# Patient Record
Sex: FEMALE | Race: WHITE
[De-identification: names, ages, dates, MRNs, and addresses within clinical notes are randomized per-mention and may not be internally consistent; named-entity substitution may affect disease eponyms.]

---

## 2017-10-21 ENCOUNTER — HOSPITAL ENCOUNTER (EMERGENCY)
Dept: HOSPITAL 53 - M ED | Age: 37
Discharge: HOME | End: 2017-10-21
Payer: SELF-PAY

## 2017-10-21 VITALS — SYSTOLIC BLOOD PRESSURE: 169 MMHG | DIASTOLIC BLOOD PRESSURE: 108 MMHG

## 2017-10-21 VITALS — BODY MASS INDEX: 30.3 KG/M2 | HEIGHT: 59 IN | WEIGHT: 150.31 LBS

## 2017-10-21 DIAGNOSIS — I10: ICD-10-CM

## 2017-10-21 DIAGNOSIS — R19.7: ICD-10-CM

## 2017-10-21 DIAGNOSIS — Z88.5: ICD-10-CM

## 2017-10-21 DIAGNOSIS — R10.9: ICD-10-CM

## 2017-10-21 DIAGNOSIS — N70.91: Primary | ICD-10-CM

## 2017-10-21 DIAGNOSIS — F17.210: ICD-10-CM

## 2017-10-21 LAB
ALBUMIN SERPL BCG-MCNC: 4.1 GM/DL (ref 3.2–5.2)
ALBUMIN/GLOB SERPL: 1.05 {RATIO} (ref 1–1.93)
ALP SERPL-CCNC: 106 U/L (ref 45–117)
ALT SERPL W P-5'-P-CCNC: 21 U/L (ref 12–78)
AMYLASE SERPL-CCNC: 53 U/L (ref 25–115)
ANION GAP SERPL CALC-SCNC: 8 MEQ/L (ref 8–16)
AST SERPL-CCNC: 10 U/L (ref 15–37)
BASOPHILS # BLD AUTO: 0.1 10^3/UL (ref 0–0.2)
BASOPHILS NFR BLD AUTO: 0.4 % (ref 0–1)
BILIRUB CONJ SERPL-MCNC: 0.2 MG/DL (ref 0–0.2)
BILIRUB SERPL-MCNC: 0.7 MG/DL (ref 0.2–1)
BUN SERPL-MCNC: 10 MG/DL (ref 7–18)
CALCIUM SERPL-MCNC: 9.1 MG/DL (ref 8.5–10.1)
CHLORIDE SERPL-SCNC: 104 MEQ/L (ref 98–107)
CO2 SERPL-SCNC: 24 MEQ/L (ref 21–32)
CONTROL LINE UCG: (no result)
CREAT SERPL-MCNC: 0.64 MG/DL (ref 0.55–1.02)
EOSINOPHIL # BLD AUTO: 0.2 10^3/UL (ref 0–0.5)
EOSINOPHIL NFR BLD AUTO: 1.5 % (ref 0–3)
ERYTHROCYTE [DISTWIDTH] IN BLOOD BY AUTOMATED COUNT: 12.7 % (ref 11.5–14.5)
GFR SERPL CREATININE-BSD FRML MDRD: > 60 ML/MIN/{1.73_M2} (ref 60–?)
GLUCOSE SERPL-MCNC: 84 MG/DL (ref 70–105)
IMM GRANULOCYTES NFR BLD: 0.2 % (ref 0–0)
LYMPHOCYTES # BLD AUTO: 2.5 10^3/UL (ref 1.5–4.5)
LYMPHOCYTES NFR BLD AUTO: 21.1 % (ref 24–44)
MCH RBC QN AUTO: 30.9 PG (ref 27–33)
MCHC RBC AUTO-ENTMCNC: 33.3 G/DL (ref 32–36.5)
MCV RBC AUTO: 93.1 FL (ref 80–96)
MONOCYTES # BLD AUTO: 0.6 10^3/UL (ref 0–0.8)
MONOCYTES NFR BLD AUTO: 5.1 % (ref 0–5)
NEUTROPHILS # BLD AUTO: 8.3 10^3/UL (ref 1.8–7.7)
NEUTROPHILS NFR BLD AUTO: 71.7 % (ref 36–66)
NRBC BLD AUTO-RTO: 0 % (ref 0–0)
PLATELET # BLD AUTO: 277 10^3/UL (ref 150–450)
POTASSIUM SERPL-SCNC: 3.4 MEQ/L (ref 3.5–5.1)
PROT SERPL-MCNC: 8 GM/DL (ref 6.4–8.2)
SODIUM SERPL-SCNC: 136 MEQ/L (ref 136–145)
WBC # BLD AUTO: 11.6 10^3/UL (ref 4–10)

## 2017-10-21 PROCEDURE — 76857 US EXAM PELVIC LIMITED: CPT

## 2017-10-21 PROCEDURE — 81001 URINALYSIS AUTO W/SCOPE: CPT

## 2017-10-21 PROCEDURE — 99284 EMERGENCY DEPT VISIT MOD MDM: CPT

## 2017-10-21 PROCEDURE — 83690 ASSAY OF LIPASE: CPT

## 2017-10-21 PROCEDURE — 82550 ASSAY OF CK (CPK): CPT

## 2017-10-21 PROCEDURE — 84703 CHORIONIC GONADOTROPIN ASSAY: CPT

## 2017-10-21 PROCEDURE — 82553 CREATINE MB FRACTION: CPT

## 2017-10-21 PROCEDURE — 80053 COMPREHEN METABOLIC PANEL: CPT

## 2017-10-21 PROCEDURE — 85025 COMPLETE CBC W/AUTO DIFF WBC: CPT

## 2017-10-21 PROCEDURE — 82150 ASSAY OF AMYLASE: CPT

## 2017-10-21 PROCEDURE — 71020: CPT

## 2017-10-21 PROCEDURE — 93005 ELECTROCARDIOGRAM TRACING: CPT

## 2017-10-21 PROCEDURE — 96374 THER/PROPH/DIAG INJ IV PUSH: CPT

## 2017-10-21 PROCEDURE — 74177 CT ABD & PELVIS W/CONTRAST: CPT

## 2017-10-21 PROCEDURE — 96376 TX/PRO/DX INJ SAME DRUG ADON: CPT

## 2017-10-21 NOTE — REPUSA
HISTORY: LT HYDROSALPINX WITH 2 RIM ENHANCHING CYSTIC LESIONS SEEN ON CT.  comparison is made to the
 CT of abdomen and pelvis today on 10/21/17 that demonstrated left hydrosalpinx.

 

TECHNIQUE: Transabdominal and transvaginal pelvic ultrasound examination with color flow Doppler imag
ing.

 

FINDINGS: Uterus is anteverted in measures 9.2 x 4.0 x 5.8 cm and endometrial thickness measures 1.3 
cm.  No uterine is seen.

 

Right ovary measures 2.7 x 3.1 x 1.9 cm with normal Doppler vascularity and contains an exophytic cys
t measuring 1.6 x 1.3 x 1.2 cm.  Left ovary measures 3.0 x 1.9 x 3.6 cm with no pathologic mass or ab
normal fluid collection seen, but there is limited visualization of the left adnexa.  Because of lubna
l gas.  The left hydrosalpinx and cystic lesions identified on CT scan are not demonstrated on ultras
ound examination.

 

IMPRESSION: 1.  Benign cyst seen in the right ovary.  Uterus is normal.

2.  Normal appearance of left ovary on limited ultrasound examination with inability to visualize the
 left hydrosalpinx and cystic lesions seen on today's CT scan.

 

Clinical correlation and follow-up imaging is suggested as indicated.  Consider follow-up CT or MRI i
f clinical symptomatology warrants further investigation.

 

     Electronically signed by LISA WHITEHEAD MD on 10/21/2017 09:30:02 PM ET

## 2017-10-21 NOTE — REPUSA
HISTORY: LUQ PAIN.

 

TECHNIQUE: Axial CT imaging of abdomen and pelvis with intravenous contrast enhancement and sagittal 
and coronal reformatted imaging. DLP= 637.9 mGy-cm.

 

FINDINGS: Lung bases are clear. No bony fracture or destructive bony lesion is seen in the lower thor
ax, abdomen, and pelvis.

 

Liver, biliary tree, pancreas, and spleen are normal.

Adrenal glands are normal. Right and left kidneys are normal with no evidence of mass lesion, obstruc
tion, or nephrolithiasis seen. Ureters and urinary bladder are normal. There is no evidence of abdomi
nal aortic aneurysm. No retroperitoneal mass lesion or hemorrhage is seen.

There is fluid-filled dilatation of the left fallopian tube measuring 1.9 cm in maximal thickness, be
st seen on the coronal sequence images 33-39, containing a rim contrast-enhanced rounded cystic struc
ture measuring 1.6 cm, and a second smaller 6 mm rim-enhancing cystic lesion at the junction of the l
eft fallopian tube and uterus seen on the axial sequence image 124.  Differential diagnostic possibil
ities include hydrosalpinx and PID, endometriosis, or possible ectopic pregnancy.  Clinical correlati
on and follow-up imaging is suggested as indicated.

 

No other pelvic mass lesions are seen.  There is minimal diverticulosis in the sigmoid colon with no 
evidence of bowel wall mass lesion, obstruction, perforation, inflammatory reaction, or evidence of d
iverticulitis.  The appendix is visualized and normal.  No abdominal wall hernia is seen.

 

 

IMPRESSION: 1.  Abnormal fluid filled distention and dilatation of the left fallopian tube consistent
 with hydrosalpinx with 2 contrast, rim enhanced cystic lesions within the dilated fallopian tube as 
discussed above.  Clinical correlation and follow-up is suggested to evaluate for possible PID, endom
etriosis, or ectopic pregnancy.

2.  Minimal diverticulosis in the sigmoid colon with no evidence of diverticulitis or other bowel abn
ormality.

3.  The remainder of the CT examination of the abdomen and pelvis is normal.

     Electronically signed by LISA WHITEHEAD MD on 10/21/2017 06:23:19 PM ET

## 2017-10-22 NOTE — ED PDOC
Post-Departure Follow-Up


radiology report faxed to [facility] clinic











Airam Ritter MD Oct 22, 2017 07:54

## 2017-10-22 NOTE — ECGEPIP
Stationary ECG Study

                           [facility] - ED

                                       

                                       Test Date:    2017-10-21

Pat Name:     PEPE LI           Department:   

Patient ID:   [id number]                 Room:         -

Gender:       F                        Technician:   

:          1980               Requested By: LYNN PALACIO PA-C

Order Number: [id number]     Reading MD:   Airam Ritter

                                 Measurements

Intervals                              Axis          

Rate:         75                       P:            48

IN:           148                      QRS:          23

QRSD:         90                       T:            10

QT:           407                                    

QTc:          456                                    

                           Interpretive Statements

SINUS RHYTHM

NSTTW ABNORMALITY

DECREASED RATE 10/18/16

Electronically Signed On 10- 8:08:07 EDT by Airam Ritter

## 2017-10-22 NOTE — REP
PA and lateral chest:

 

Comparison is 07/13/2016.

 

The lung fields are clear.  The cardiac size is normal

 

The christopher, mediastinum, and bony thorax are unremarkable.

 

Impression:

 

Negative PA and lateral chest.  There is no interval change

 

 

Signed by

Zca Bose MD 10/22/2017 08:32 A

## 2018-01-26 ENCOUNTER — HOSPITAL ENCOUNTER (EMERGENCY)
Dept: HOSPITAL 53 - M ED | Age: 38
Discharge: HOME | End: 2018-01-26
Payer: SELF-PAY
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
## 2018-01-26 DIAGNOSIS — S20.229A: ICD-10-CM
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
## 2018-01-26 DIAGNOSIS — Z88.5: ICD-10-CM

## 2018-01-26 DIAGNOSIS — S00.83XA: ICD-10-CM

## 2018-01-26 DIAGNOSIS — S80.02XA: ICD-10-CM

## 2018-01-26 DIAGNOSIS — S51.812A: Primary | ICD-10-CM

## 2018-01-26 DIAGNOSIS — I10: ICD-10-CM

## 2018-01-26 DIAGNOSIS — F17.210: ICD-10-CM

## 2018-01-26 PROCEDURE — 90714 TD VACC NO PRESV 7 YRS+ IM: CPT

## 2018-01-26 RX ADMIN — OXYCODONE HYDROCHLORIDE AND ACETAMINOPHEN 1 TAB: 5; 325 TABLET ORAL at 22:30

## 2018-01-26 RX ADMIN — TETANUS AND DIPHTHERIA TOXOIDS ADSORBED 1 ML: 2; 2 INJECTION INTRAMUSCULAR at 21:08

## 2018-01-26 RX ADMIN — LIDOCAINE HYDROCHLORIDE,EPINEPHRINE BITARTRATE 1 ML: 10; .01 INJECTION, SOLUTION INFILTRATION; PERINEURAL at 21:00

## 2021-04-30 ENCOUNTER — HOSPITAL ENCOUNTER (OUTPATIENT)
Dept: HOSPITAL 53 - M WUC | Age: 41
End: 2021-04-30
Attending: PHYSICIAN ASSISTANT
Payer: COMMERCIAL

## 2021-04-30 DIAGNOSIS — R10.12: Primary | ICD-10-CM

## 2021-04-30 LAB
ALBUMIN SERPL BCG-MCNC: 4 GM/DL (ref 3.2–5.2)
ALT SERPL W P-5'-P-CCNC: 24 U/L (ref 12–78)
BASOPHILS # BLD AUTO: 0.1 10^3/UL (ref 0–0.2)
BASOPHILS NFR BLD AUTO: 0.9 % (ref 0–1)
BILIRUB SERPL-MCNC: 0.3 MG/DL (ref 0.2–1)
BUN SERPL-MCNC: 6 MG/DL (ref 7–18)
CALCIUM SERPL-MCNC: 9.2 MG/DL (ref 8.5–10.1)
CHLORIDE SERPL-SCNC: 107 MEQ/L (ref 98–107)
CO2 SERPL-SCNC: 24 MEQ/L (ref 21–32)
CREAT SERPL-MCNC: 0.56 MG/DL (ref 0.55–1.3)
EOSINOPHIL # BLD AUTO: 0.2 10^3/UL (ref 0–0.5)
EOSINOPHIL NFR BLD AUTO: 2.5 % (ref 0–3)
GFR SERPL CREATININE-BSD FRML MDRD: > 60 ML/MIN/{1.73_M2} (ref 60–?)
GLUCOSE SERPL-MCNC: 92 MG/DL (ref 70–100)
HCT VFR BLD AUTO: 44.8 % (ref 36–47)
HGB BLD-MCNC: 14.1 G/DL (ref 12–15.5)
LIPASE SERPL-CCNC: 155 U/L (ref 73–393)
LYMPHOCYTES # BLD AUTO: 2.4 10^3/UL (ref 1.5–5)
LYMPHOCYTES NFR BLD AUTO: 25.5 % (ref 24–44)
MCH RBC QN AUTO: 29.6 PG (ref 27–33)
MCHC RBC AUTO-ENTMCNC: 31.5 G/DL (ref 32–36.5)
MCV RBC AUTO: 93.9 FL (ref 80–96)
MONOCYTES # BLD AUTO: 0.5 10^3/UL (ref 0–0.8)
MONOCYTES NFR BLD AUTO: 4.9 % (ref 2–8)
NEUTROPHILS # BLD AUTO: 6.1 10^3/UL (ref 1.5–8.5)
NEUTROPHILS NFR BLD AUTO: 65.8 % (ref 36–66)
PLATELET # BLD AUTO: 331 10^3/UL (ref 150–450)
POTASSIUM SERPL-SCNC: 4.1 MEQ/L (ref 3.5–5.1)
PROT SERPL-MCNC: 7.5 GM/DL (ref 6.4–8.2)
RBC # BLD AUTO: 4.77 10^6/UL (ref 4–5.4)
SODIUM SERPL-SCNC: 137 MEQ/L (ref 136–145)
WBC # BLD AUTO: 9.2 10^3/UL (ref 4–10)

## 2022-07-29 ENCOUNTER — HOSPITAL ENCOUNTER (OUTPATIENT)
Dept: HOSPITAL 53 - M WUC | Age: 42
End: 2022-07-29
Attending: PHYSICIAN ASSISTANT
Payer: COMMERCIAL

## 2022-07-29 DIAGNOSIS — Z72.0: ICD-10-CM

## 2022-07-29 DIAGNOSIS — Z20.828: ICD-10-CM

## 2022-07-29 DIAGNOSIS — J20.9: Primary | ICD-10-CM
